# Patient Record
Sex: FEMALE | Race: WHITE | Employment: UNEMPLOYED | URBAN - METROPOLITAN AREA
[De-identification: names, ages, dates, MRNs, and addresses within clinical notes are randomized per-mention and may not be internally consistent; named-entity substitution may affect disease eponyms.]

---

## 2019-05-05 LAB
ANTIBODY SCREEN, EXTERNAL: NORMAL
HCT, EXTERNAL: 36.7
HGB, EXTERNAL: NORMAL
RPR, EXTERNAL: NORMAL
RUBELLA, EXTERNAL: NORMAL
TYPE, ABO & RH, EXTERNAL: NORMAL

## 2019-05-23 ENCOUNTER — HOSPITAL ENCOUNTER (OUTPATIENT)
Age: 24
Setting detail: OBSERVATION
Discharge: HOME | End: 2019-05-23
Attending: SPECIALIST | Admitting: OBSTETRICS & GYNECOLOGY
Payer: COMMERCIAL

## 2019-05-23 VITALS
HEART RATE: 90 BPM | WEIGHT: 137 LBS | TEMPERATURE: 99.4 F | SYSTOLIC BLOOD PRESSURE: 106 MMHG | RESPIRATION RATE: 17 BRPM | HEIGHT: 69 IN | BODY MASS INDEX: 20.29 KG/M2 | DIASTOLIC BLOOD PRESSURE: 61 MMHG

## 2019-05-23 PROBLEM — R10.9 ABDOMINAL CRAMPING: Status: ACTIVE | Noted: 2019-05-23

## 2019-05-23 PROBLEM — Z34.90 PREGNANCY: Status: ACTIVE | Noted: 2019-05-23

## 2019-05-23 LAB
ALBUMIN SERPL-MCNC: 3.2 G/DL (ref 3.4–5)
ALBUMIN/GLOB SERPL: 0.9 {RATIO} (ref 0.8–1.7)
ALP SERPL-CCNC: 94 U/L (ref 45–117)
ALT SERPL-CCNC: 21 U/L (ref 13–56)
ANION GAP SERPL CALC-SCNC: 9 MMOL/L (ref 3–18)
AST SERPL-CCNC: 12 U/L (ref 15–37)
BASOPHILS # BLD: 0 K/UL (ref 0–0.1)
BASOPHILS NFR BLD: 0 % (ref 0–2)
BILIRUB SERPL-MCNC: 0.3 MG/DL (ref 0.2–1)
BUN SERPL-MCNC: 7 MG/DL (ref 7–18)
BUN/CREAT SERPL: 9 (ref 12–20)
CALCIUM SERPL-MCNC: 8.3 MG/DL (ref 8.5–10.1)
CHLORIDE SERPL-SCNC: 104 MMOL/L (ref 100–108)
CO2 SERPL-SCNC: 23 MMOL/L (ref 21–32)
CREAT SERPL-MCNC: 0.78 MG/DL (ref 0.6–1.3)
DIFFERENTIAL METHOD BLD: ABNORMAL
EOSINOPHIL # BLD: 0.1 K/UL (ref 0–0.4)
EOSINOPHIL NFR BLD: 1 % (ref 0–5)
ERYTHROCYTE [DISTWIDTH] IN BLOOD BY AUTOMATED COUNT: 12.6 % (ref 11.6–14.5)
FIBRONECTIN FETAL VAG QL: NEGATIVE
GLOBULIN SER CALC-MCNC: 3.5 G/DL (ref 2–4)
GLUCOSE SERPL-MCNC: 74 MG/DL (ref 74–99)
HCT VFR BLD AUTO: 32.7 % (ref 35–45)
HGB BLD-MCNC: 11 G/DL (ref 12–16)
LYMPHOCYTES # BLD: 1.4 K/UL (ref 0.9–3.6)
LYMPHOCYTES NFR BLD: 14 % (ref 21–52)
MCH RBC QN AUTO: 30.1 PG (ref 24–34)
MCHC RBC AUTO-ENTMCNC: 33.6 G/DL (ref 31–37)
MCV RBC AUTO: 89.6 FL (ref 74–97)
MONOCYTES # BLD: 0.7 K/UL (ref 0.05–1.2)
MONOCYTES NFR BLD: 7 % (ref 3–10)
NEUTS SEG # BLD: 7.8 K/UL (ref 1.8–8)
NEUTS SEG NFR BLD: 78 % (ref 40–73)
PLATELET # BLD AUTO: 171 K/UL (ref 135–420)
PMV BLD AUTO: 9.6 FL (ref 9.2–11.8)
POTASSIUM SERPL-SCNC: 3.6 MMOL/L (ref 3.5–5.5)
PROT SERPL-MCNC: 6.7 G/DL (ref 6.4–8.2)
RBC # BLD AUTO: 3.65 M/UL (ref 4.2–5.3)
SODIUM SERPL-SCNC: 136 MMOL/L (ref 136–145)
WBC # BLD AUTO: 10 K/UL (ref 4.6–13.2)

## 2019-05-23 PROCEDURE — 81003 URINALYSIS AUTO W/O SCOPE: CPT

## 2019-05-23 PROCEDURE — 99218 HC RM OBSERVATION: CPT

## 2019-05-23 PROCEDURE — 80053 COMPREHEN METABOLIC PANEL: CPT

## 2019-05-23 PROCEDURE — 85025 COMPLETE CBC W/AUTO DIFF WBC: CPT

## 2019-05-23 PROCEDURE — 82731 ASSAY OF FETAL FIBRONECTIN: CPT

## 2019-05-23 PROCEDURE — 76815 OB US LIMITED FETUS(S): CPT

## 2019-05-23 PROCEDURE — 99283 EMERGENCY DEPT VISIT LOW MDM: CPT

## 2019-05-23 NOTE — PROGRESS NOTES
22 yo cf,. 23 3/7 wks in from dr nye's office with complaints of diarrhea x 3 days,feeling tired and nausea x1 yesterday. denies temp. Diarrhea couple of times each am. Lower back pain and lower abd pain. Monitors applied.     Pt denies srom, denies vag bleeding denies PIH s/s

## 2019-05-23 NOTE — PROGRESS NOTES
Dr bledsoe see pt.orders to d/c pt to home. d/c instructions pregnancy precautions, belly pain and back pain and pregnancy at 22 to 26 wks printed and reviewed with pt prior to d/c.

## 2019-05-23 NOTE — H&P
Obstetrical Problem History & Physical     Name: Emelina Camarena MRN: [id number]  SSN: [id number]    YOB: 1995  Age: 21 y.o. Sex: female        Subjective:   Chief complaint:    Chief Complaint   Patient presents with    Abdominal Pain    Diarrhea     23yo [id number] at 23.3wks with MYKE 19 by LMP c/w 6wk US in [place], moved here 1 month ago. She reports walked for several hours on  pushing her 10year old in the stroller. Since Monday, she has had loose stools in the morning only, and intermittent low abdominal cramping and low back pain. Denies fevers, chill, CP, SOB, UTI symptoms, constipation. She reports emesis rarely since then a/w nausea. Tolerating regular diet. Denies VB/LOF.  +FM. Reports  with similar symptoms. Daughter with sinus congestion.     OB HISTORY  G1-2 - EAB 1st trimester x 2 (16yrs, 19yrs)  G3 - SAB at <6wks (21yrs)  G4 -  at 38.5wks, labor F, 7lb 2oz, delivered in [facility], [address] [facility]  Denies    PAST SURGICAL HISTORY  C5-C6 spinal fusion 2017 after fracture    SOCIAL HISTORY  Social History     Socioeconomic History    Marital status:      Spouse name: Not on file    Number of children: Not on file    Years of education: Not on file    Highest education level: Not on file   Occupational History    Not on file   Social Needs    Financial resource strain: Not on file    Food insecurity:     Worry: Not on file     Inability: Not on file    Transportation needs:     Medical: Not on file     Non-medical: Not on file   Tobacco Use    Smoking status: Not on file   Substance and Sexual Activity    Alcohol use: Not on file    Drug use: Not on file    Sexual activity: Not on file   Lifestyle    Physical activity:     Days per week: Not on file     Minutes per session: Not on file    Stress: Not on file   Relationships    Social connections:     Talks on phone: Not on file     Gets together: Not on file Attends [employer] service: Not on file     Active member of club or organization: Not on file     Attends meetings of clubs or organizations: Not on file     Relationship status: Not on file    Intimate partner violence:     Fear of current or ex partner: Not on file     Emotionally abused: Not on file     Physically abused: Not on file     Forced sexual activity: Not on file   Other Topics Concern    Not on file   Social History Narrative    Not on file   .  from here, moved to [place] 1 month ago from [facility]. Quit smoking this pregnancy. Had 1 glass of wine this week with subsequent emesis. Denies regular alcohol intake. Denies illicit drug use. FAMILY HISTORY  Denies     ALLERGY:  NKDA    HOME MEDICATIONS:  Prenatal Vitamin 1 tablet  po daily    Review of Systems:  A comprehensive review of systems was negative except for mentioned in HPI. Objective:   VITAL SIGNS:  Visit Vitals  /61 (BP 1 Location: Left arm, BP Patient Position: Sitting)   Pulse 90   Temp 99.4 °F (37.4 °C)   Resp 17       Physical Exam:  Gen - A&O, NAD  Abd - soft, gravid, nontender  Pelvic:  No masses/lesions, physiologic discharge, no bleeding  Cervix: cl/th/hi, posterior  Extremities: nontender, no edema/redness    BSUS:  Transverse to complete breech, anterior placenta intact without evidence of abruption, MBP 5.7cm  TOCO:  No ctxs  FHTs:  140s min-mod variability, no decels    UA neg protein, nitrites, blood, ketones, bili, trace leuks, specific gravity 1/015. Assessment/Plan:     23yo [id number] at 23.3wks with abdominal cramping and loose stools  - Afebrile. Exam wnl. CBC, CMP, FFN obtained. - Pt already tolerating regular diet. Continue to po hydrate. UA wnl.      Signed By:    Michael Ball MD  May 23, 2019 4:27 PM

## 2019-05-23 NOTE — PROGRESS NOTES
Spoke with dr Eddie Anthony that is covering for dr Christina Worley.  Orders for urine dip-results given, cbc and cmp

## 2019-05-23 NOTE — DISCHARGE SUMMARY
Obstetrical Discharge Summary     Name: Navneet Bello MRN: [id number]  SSN: [id number]    YOB: 1995  Age: 21 y.o. Sex: female      Admit Date: 5/23/2019    Discharge Date: 5/23/2019     Admitting Physician: Enoch Castellano MD     Attending Physician:  Ashley Garcia MD     Admission Diagnoses: Abdominal cramping [R10.9]  Pregnancy [Z34.90]    Discharge Diagnoses: This patient has no babies on file. Additional Diagnoses:   Hospital Problems  Date Reviewed: 5/23/2019          Codes Class Noted POA    Pregnancy ICD-10-CM: Z34.90  ICD-9-CM: V22.2  5/23/2019 Yes        Abdominal cramping ICD-10-CM: R10.9  ICD-9-CM: 789.00  5/23/2019 Yes             Lab Results   Component Value Date/Time    Rubella, External immune 05/05/2019       Immunization(s):   There is no immunization history on file for this patient. Labs:   Recent Results (from the past 24 hour(s))   CBC WITH AUTOMATED DIFF    Collection Time: 05/23/19  3:30 PM   Result Value Ref Range    WBC 10.0 4.6 - 13.2 K/uL    RBC 3.65 (L) 4.20 - 5.30 M/uL    HGB 11.0 (L) 12.0 - 16.0 g/dL    HCT 32.7 (L) 35.0 - 45.0 %    MCV 89.6 74.0 - 97.0 FL    MCH 30.1 24.0 - 34.0 PG    MCHC 33.6 31.0 - 37.0 g/dL    RDW 12.6 11.6 - 14.5 %    PLATELET 362 156 - 620 K/uL    MPV 9.6 9.2 - 11.8 FL    NEUTROPHILS 78 (H) 40 - 73 %    LYMPHOCYTES 14 (L) 21 - 52 %    MONOCYTES 7 3 - 10 %    EOSINOPHILS 1 0 - 5 %    BASOPHILS 0 0 - 2 %    ABS. NEUTROPHILS 7.8 1.8 - 8.0 K/UL    ABS. LYMPHOCYTES 1.4 0.9 - 3.6 K/UL    ABS. MONOCYTES 0.7 0.05 - 1.2 K/UL    ABS. EOSINOPHILS 0.1 0.0 - 0.4 K/UL    ABS.  BASOPHILS 0.0 0.0 - 0.1 K/UL    DF AUTOMATED     METABOLIC PANEL, COMPREHENSIVE    Collection Time: 05/23/19  3:30 PM   Result Value Ref Range    Sodium 136 136 - 145 mmol/L    Potassium 3.6 3.5 - 5.5 mmol/L    Chloride 104 100 - 108 mmol/L    CO2 23 21 - 32 mmol/L    Anion gap 9 3.0 - 18 mmol/L    Glucose 74 74 - 99 mg/dL    BUN 7 7.0 - 18 MG/DL    Creatinine 0.78 0.6 - 1.3 MG/DL    BUN/Creatinine ratio 9 (L) 12 - 20      GFR est AA >60 >60 ml/min/1.73m2    GFR est non-AA >60 >60 ml/min/1.73m2    Calcium 8.3 (L) 8.5 - 10.1 MG/DL    Bilirubin, total 0.3 0.2 - 1.0 MG/DL    ALT (SGPT) 21 13 - 56 U/L    AST (SGOT) 12 (L) 15 - 37 U/L    Alk. phosphatase 94 45 - 117 U/L    Protein, total 6.7 6.4 - 8.2 g/dL    Albumin 3.2 (L) 3.4 - 5.0 g/dL    Globulin 3.5 2.0 - 4.0 g/dL    A-G Ratio 0.9 0.8 - 1.7     FETAL FIBRONECTIN    Collection Time: 05/23/19  4:20 PM   Result Value Ref Range    Fetal fibronectin NEGATIVE  NEG         Exam:  Genera:  Alert & oriented, no apparent distress  Abdomen:  Soft, gravid, nttp  Extremities:  LE b/l without clubbing/cyanosis/edema/redness, nontender to palpation    Hospital Course: 21 y.o. [id number] at 23.3wks with abdominal cramping and loose stools. CBC, CMP wnl. FFN negative. Pt continues to tolerate regular diet. She has appt to follow up with Dr. Rani Castillo 5/27. Fever, pain, bleeding precautions reviewed. Pt voices an understanding and desires to go home. Patient Instructions:   Current Discharge Medication List      CONTINUE these medications which have NOT CHANGED    Details   [id number] sanjuanita-folic-dha (PRENATAL DHA+COMPLETE PRENATAL) -300 mg-mcg-mg cmpk Take  by mouth. Indications: pregnancy             Reference my discharge instructions. Follow-up Appointments   Procedures    FOLLOW UP VISIT Appointment in: One Week Follow up at scheduled appt with Dr. Rani Castillo 5/28. Follow up at scheduled appt with Dr. Rani Castillo 5/28. Standing Status:   Standing     Number of Occurrences:   1     Order Specific Question:   Appointment in     Answer:    One Week        Signed By:  Jojo Lyle MD     May 23, 2019

## 2019-05-24 LAB
APPEARANCE UR: CLEAR
BILIRUB UR QL: NEGATIVE
COLOR UR: YELLOW
GLUCOSE UR QL STRIP.AUTO: NEGATIVE MG/DL
KETONES UR-MCNC: NEGATIVE MG/DL
LEUKOCYTE ESTERASE UR QL STRIP: ABNORMAL
NITRITE UR QL: NEGATIVE
PH UR: 7.5 [PH] (ref 5–9)
PROT UR QL: NEGATIVE MG/DL
RBC # UR STRIP: NEGATIVE /UL
SERVICE CMNT-IMP: ABNORMAL
SP GR UR: 1.01 (ref 1–1.02)
UROBILINOGEN UR QL: 0.2 EU/DL (ref 0.2–1)

## 2019-09-14 ENCOUNTER — HOSPITAL ENCOUNTER (INPATIENT)
Age: 24
LOS: 2 days | Discharge: HOME | End: 2019-09-16
Attending: SPECIALIST | Admitting: SPECIALIST
Payer: COMMERCIAL

## 2019-09-14 ENCOUNTER — ANESTHESIA EVENT (OUTPATIENT)
Dept: LABOR AND DELIVERY | Age: 24
End: 2019-09-14
Payer: COMMERCIAL

## 2019-09-14 ENCOUNTER — ANESTHESIA (OUTPATIENT)
Dept: LABOR AND DELIVERY | Age: 24
End: 2019-09-14
Payer: COMMERCIAL

## 2019-09-14 LAB
ABO + RH BLD: NORMAL
BASOPHILS # BLD: 0 K/UL (ref 0–0.1)
BASOPHILS # BLD: 0 K/UL (ref 0–0.1)
BASOPHILS NFR BLD: 0 % (ref 0–2)
BASOPHILS NFR BLD: 0 % (ref 0–2)
BLOOD GROUP ANTIBODIES SERPL: NORMAL
BLOOD GROUP ANTIBODIES SERPL: NORMAL
DIFFERENTIAL METHOD BLD: ABNORMAL
DIFFERENTIAL METHOD BLD: ABNORMAL
EOSINOPHIL # BLD: 0.1 K/UL (ref 0–0.4)
EOSINOPHIL # BLD: 0.1 K/UL (ref 0–0.4)
EOSINOPHIL NFR BLD: 1 % (ref 0–5)
EOSINOPHIL NFR BLD: 1 % (ref 0–5)
ERYTHROCYTE [DISTWIDTH] IN BLOOD BY AUTOMATED COUNT: 13.3 % (ref 11.6–14.5)
ERYTHROCYTE [DISTWIDTH] IN BLOOD BY AUTOMATED COUNT: 13.4 % (ref 11.6–14.5)
HCT VFR BLD AUTO: 21.7 % (ref 35–45)
HCT VFR BLD AUTO: 32.3 % (ref 35–45)
HGB BLD-MCNC: 10.8 G/DL (ref 12–16)
HGB BLD-MCNC: 7.6 G/DL (ref 12–16)
LYMPHOCYTES # BLD: 1.6 K/UL (ref 0.9–3.6)
LYMPHOCYTES # BLD: 2.4 K/UL (ref 0.9–3.6)
LYMPHOCYTES NFR BLD: 18 % (ref 21–52)
LYMPHOCYTES NFR BLD: 23 % (ref 21–52)
MCH RBC QN AUTO: 29.5 PG (ref 24–34)
MCH RBC QN AUTO: 30.8 PG (ref 24–34)
MCHC RBC AUTO-ENTMCNC: 33.4 G/DL (ref 31–37)
MCHC RBC AUTO-ENTMCNC: 35 G/DL (ref 31–37)
MCV RBC AUTO: 87.9 FL (ref 74–97)
MCV RBC AUTO: 88.3 FL (ref 74–97)
MONOCYTES # BLD: 0.4 K/UL (ref 0.05–1.2)
MONOCYTES # BLD: 0.7 K/UL (ref 0.05–1.2)
MONOCYTES NFR BLD: 5 % (ref 3–10)
MONOCYTES NFR BLD: 6 % (ref 3–10)
NEUTS SEG # BLD: 10.2 K/UL (ref 1.8–8)
NEUTS SEG # BLD: 4.9 K/UL (ref 1.8–8)
NEUTS SEG NFR BLD: 70 % (ref 40–73)
NEUTS SEG NFR BLD: 76 % (ref 40–73)
PLATELET # BLD AUTO: 175 K/UL (ref 135–420)
PLATELET # BLD AUTO: 89 K/UL (ref 135–420)
PMV BLD AUTO: 11.2 FL (ref 9.2–11.8)
PMV BLD AUTO: 9.5 FL (ref 9.2–11.8)
RBC # BLD AUTO: 2.47 M/UL (ref 4.2–5.3)
RBC # BLD AUTO: 3.66 M/UL (ref 4.2–5.3)
SPECIMEN EXP DATE BLD: NORMAL
WBC # BLD AUTO: 13.5 K/UL (ref 4.6–13.2)
WBC # BLD AUTO: 6.9 K/UL (ref 4.6–13.2)

## 2019-09-14 PROCEDURE — 75410000000 HC DELIVERY VAGINAL/SINGLE

## 2019-09-14 PROCEDURE — 76060000078 HC EPIDURAL ANESTHESIA

## 2019-09-14 PROCEDURE — 74011000250 HC RX REV CODE- 250: Performed by: NURSE ANESTHETIST, CERTIFIED REGISTERED

## 2019-09-14 PROCEDURE — 75410000002 HC LABOR FEE PER 1 HR

## 2019-09-14 PROCEDURE — 00HU33Z INSERTION OF INFUSION DEVICE INTO SPINAL CANAL, PERCUTANEOUS APPROACH: ICD-10-PCS | Performed by: NURSE ANESTHETIST, CERTIFIED REGISTERED

## 2019-09-14 PROCEDURE — 86870 RBC ANTIBODY IDENTIFICATION: CPT

## 2019-09-14 PROCEDURE — 74011250636 HC RX REV CODE- 250/636: Performed by: SPECIALIST

## 2019-09-14 PROCEDURE — 74011250636 HC RX REV CODE- 250/636

## 2019-09-14 PROCEDURE — 75410000003 HC RECOV DEL/VAG/CSECN EA 0.5 HR

## 2019-09-14 PROCEDURE — 77030034849

## 2019-09-14 PROCEDURE — 86900 BLOOD TYPING SEROLOGIC ABO: CPT

## 2019-09-14 PROCEDURE — 74011250637 HC RX REV CODE- 250/637: Performed by: SPECIALIST

## 2019-09-14 PROCEDURE — 65270000029 HC RM PRIVATE

## 2019-09-14 PROCEDURE — 74011000250 HC RX REV CODE- 250

## 2019-09-14 PROCEDURE — 77030007879 HC KT SPN EPDRL TELE -B: Performed by: NURSE ANESTHETIST, CERTIFIED REGISTERED

## 2019-09-14 PROCEDURE — 85025 COMPLETE CBC W/AUTO DIFF WBC: CPT

## 2019-09-14 PROCEDURE — 74011250636 HC RX REV CODE- 250/636: Performed by: NURSE ANESTHETIST, CERTIFIED REGISTERED

## 2019-09-14 PROCEDURE — 51702 INSERT TEMP BLADDER CATH: CPT

## 2019-09-14 RX ORDER — ONDANSETRON 2 MG/ML
4 INJECTION INTRAMUSCULAR; INTRAVENOUS
Status: DISCONTINUED | OUTPATIENT
Start: 2019-09-14 | End: 2019-09-14 | Stop reason: HOSPADM

## 2019-09-14 RX ORDER — ACETAMINOPHEN 325 MG/1
650 TABLET ORAL
Status: DISCONTINUED | OUTPATIENT
Start: 2019-09-14 | End: 2019-09-14 | Stop reason: HOSPADM

## 2019-09-14 RX ORDER — OXYTOCIN/RINGER'S LACTATE 20/1000 ML
999 PLASTIC BAG, INJECTION (ML) INTRAVENOUS ONCE
Status: COMPLETED | OUTPATIENT
Start: 2019-09-14 | End: 2019-09-14

## 2019-09-14 RX ORDER — ZOLPIDEM TARTRATE 5 MG/1
5 TABLET ORAL
Status: DISCONTINUED | OUTPATIENT
Start: 2019-09-14 | End: 2019-09-16 | Stop reason: HOSPADM

## 2019-09-14 RX ORDER — AMOXICILLIN 250 MG
1 CAPSULE ORAL
Status: DISCONTINUED | OUTPATIENT
Start: 2019-09-14 | End: 2019-09-16 | Stop reason: HOSPADM

## 2019-09-14 RX ORDER — PROMETHAZINE HYDROCHLORIDE 25 MG/ML
25 INJECTION, SOLUTION INTRAMUSCULAR; INTRAVENOUS
Status: DISCONTINUED | OUTPATIENT
Start: 2019-09-14 | End: 2019-09-16 | Stop reason: HOSPADM

## 2019-09-14 RX ORDER — NALOXONE HYDROCHLORIDE 0.4 MG/ML
0.4 INJECTION, SOLUTION INTRAMUSCULAR; INTRAVENOUS; SUBCUTANEOUS AS NEEDED
Status: DISCONTINUED | OUTPATIENT
Start: 2019-09-14 | End: 2019-09-16 | Stop reason: HOSPADM

## 2019-09-14 RX ORDER — HYDROMORPHONE HYDROCHLORIDE 1 MG/ML
1 INJECTION, SOLUTION INTRAMUSCULAR; INTRAVENOUS; SUBCUTANEOUS
Status: DISCONTINUED | OUTPATIENT
Start: 2019-09-14 | End: 2019-09-14 | Stop reason: HOSPADM

## 2019-09-14 RX ORDER — DIPHENHYDRAMINE HYDROCHLORIDE 50 MG/ML
25 INJECTION, SOLUTION INTRAMUSCULAR; INTRAVENOUS
Status: DISCONTINUED | OUTPATIENT
Start: 2019-09-14 | End: 2019-09-14 | Stop reason: HOSPADM

## 2019-09-14 RX ORDER — NALBUPHINE HYDROCHLORIDE 10 MG/ML
2.5 INJECTION, SOLUTION INTRAMUSCULAR; INTRAVENOUS; SUBCUTANEOUS
Status: DISCONTINUED | OUTPATIENT
Start: 2019-09-14 | End: 2019-09-14 | Stop reason: HOSPADM

## 2019-09-14 RX ORDER — ROPIVACAINE HYDROCHLORIDE 2 MG/ML
INJECTION, SOLUTION EPIDURAL; INFILTRATION; PERINEURAL AS NEEDED
Status: DISCONTINUED | OUTPATIENT
Start: 2019-09-14 | End: 2019-09-14 | Stop reason: HOSPADM

## 2019-09-14 RX ORDER — IBUPROFEN 400 MG/1
800 TABLET ORAL
Status: DISCONTINUED | OUTPATIENT
Start: 2019-09-14 | End: 2019-09-16 | Stop reason: HOSPADM

## 2019-09-14 RX ORDER — PHENYLEPHRINE HCL IN 0.9% NACL 1 MG/10 ML
80 SYRINGE (ML) INTRAVENOUS AS NEEDED
Status: DISCONTINUED | OUTPATIENT
Start: 2019-09-14 | End: 2019-09-14 | Stop reason: HOSPADM

## 2019-09-14 RX ORDER — LIDOCAINE HYDROCHLORIDE 10 MG/ML
30 INJECTION, SOLUTION EPIDURAL; INFILTRATION; INTRACAUDAL; PERINEURAL AS NEEDED
Status: DISCONTINUED | OUTPATIENT
Start: 2019-09-14 | End: 2019-09-14 | Stop reason: HOSPADM

## 2019-09-14 RX ORDER — MAG HYDROX/ALUMINUM HYD/SIMETH 200-200-20
15 SUSPENSION, ORAL (FINAL DOSE FORM) ORAL
Status: DISCONTINUED | OUTPATIENT
Start: 2019-09-14 | End: 2019-09-14 | Stop reason: HOSPADM

## 2019-09-14 RX ORDER — MISOPROSTOL 200 UG/1
800 TABLET ORAL
Status: ACTIVE | OUTPATIENT
Start: 2019-09-14 | End: 2019-09-15

## 2019-09-14 RX ORDER — OXYTOCIN/RINGER'S LACTATE 20/1000 ML
125 PLASTIC BAG, INJECTION (ML) INTRAVENOUS CONTINUOUS
Status: DISCONTINUED | OUTPATIENT
Start: 2019-09-14 | End: 2019-09-14 | Stop reason: HOSPADM

## 2019-09-14 RX ORDER — OXYCODONE AND ACETAMINOPHEN 5; 325 MG/1; MG/1
2 TABLET ORAL
Status: DISCONTINUED | OUTPATIENT
Start: 2019-09-14 | End: 2019-09-16 | Stop reason: HOSPADM

## 2019-09-14 RX ORDER — MISOPROSTOL 200 UG/1
TABLET ORAL
Status: DISCONTINUED
Start: 2019-09-14 | End: 2019-09-14 | Stop reason: WASHOUT

## 2019-09-14 RX ORDER — TERBUTALINE SULFATE 1 MG/ML
0.25 INJECTION SUBCUTANEOUS
Status: DISCONTINUED | OUTPATIENT
Start: 2019-09-14 | End: 2019-09-14 | Stop reason: HOSPADM

## 2019-09-14 RX ORDER — LIDOCAINE HYDROCHLORIDE AND EPINEPHRINE 15; 5 MG/ML; UG/ML
INJECTION, SOLUTION EPIDURAL
Status: COMPLETED | OUTPATIENT
Start: 2019-09-14 | End: 2019-09-14

## 2019-09-14 RX ORDER — SALICYLIC ACID
90 POWDER (GRAM) MISCELLANEOUS ONCE
Status: DISPENSED | OUTPATIENT
Start: 2019-09-14 | End: 2019-09-14

## 2019-09-14 RX ORDER — METHYLERGONOVINE MALEATE 0.2 MG/ML
0.2 INJECTION INTRAVENOUS AS NEEDED
Status: DISCONTINUED | OUTPATIENT
Start: 2019-09-14 | End: 2019-09-16 | Stop reason: HOSPADM

## 2019-09-14 RX ORDER — FENTANYL CITRATE 50 UG/ML
100 INJECTION, SOLUTION INTRAMUSCULAR; INTRAVENOUS ONCE
Status: DISCONTINUED | OUTPATIENT
Start: 2019-09-14 | End: 2019-09-14 | Stop reason: HOSPADM

## 2019-09-14 RX ORDER — ACETAMINOPHEN 325 MG/1
650 TABLET ORAL
Status: DISCONTINUED | OUTPATIENT
Start: 2019-09-14 | End: 2019-09-16 | Stop reason: HOSPADM

## 2019-09-14 RX ORDER — OXYTOCIN 10 [USP'U]/ML
10 INJECTION, SOLUTION INTRAMUSCULAR; INTRAVENOUS
Status: DISCONTINUED | OUTPATIENT
Start: 2019-09-14 | End: 2019-09-14 | Stop reason: HOSPADM

## 2019-09-14 RX ORDER — FENTANYL CITRATE 50 UG/ML
100 INJECTION, SOLUTION INTRAMUSCULAR; INTRAVENOUS ONCE
Status: COMPLETED | OUTPATIENT
Start: 2019-09-14 | End: 2019-09-14

## 2019-09-14 RX ORDER — PHENYLEPHRINE HCL IN 0.9% NACL 1 MG/10 ML
100 SYRINGE (ML) INTRAVENOUS AS NEEDED
Status: DISCONTINUED | OUTPATIENT
Start: 2019-09-14 | End: 2019-09-14 | Stop reason: HOSPADM

## 2019-09-14 RX ORDER — NALBUPHINE HYDROCHLORIDE 10 MG/ML
10 INJECTION, SOLUTION INTRAMUSCULAR; INTRAVENOUS; SUBCUTANEOUS
Status: DISCONTINUED | OUTPATIENT
Start: 2019-09-14 | End: 2019-09-14 | Stop reason: HOSPADM

## 2019-09-14 RX ORDER — NALOXONE HYDROCHLORIDE 0.4 MG/ML
0.2 INJECTION, SOLUTION INTRAMUSCULAR; INTRAVENOUS; SUBCUTANEOUS AS NEEDED
Status: DISCONTINUED | OUTPATIENT
Start: 2019-09-14 | End: 2019-09-14 | Stop reason: HOSPADM

## 2019-09-14 RX ORDER — SODIUM CHLORIDE, SODIUM LACTATE, POTASSIUM CHLORIDE, CALCIUM CHLORIDE 600; 310; 30; 20 MG/100ML; MG/100ML; MG/100ML; MG/100ML
125 INJECTION, SOLUTION INTRAVENOUS CONTINUOUS
Status: DISCONTINUED | OUTPATIENT
Start: 2019-09-14 | End: 2019-09-14 | Stop reason: HOSPADM

## 2019-09-14 RX ORDER — MINERAL OIL
30 OIL (ML) ORAL AS NEEDED
Status: DISCONTINUED | OUTPATIENT
Start: 2019-09-14 | End: 2019-09-14 | Stop reason: HOSPADM

## 2019-09-14 RX ORDER — EPHEDRINE SULFATE/0.9% NACL/PF 50 MG/5 ML
10 SYRINGE (ML) INTRAVENOUS AS NEEDED
Status: COMPLETED | OUTPATIENT
Start: 2019-09-14 | End: 2019-09-14

## 2019-09-14 RX ADMIN — Medication 10 ML/HR: at 11:30

## 2019-09-14 RX ADMIN — NALBUPHINE HYDROCHLORIDE 10 MG: 10 INJECTION, SOLUTION INTRAMUSCULAR; INTRAVENOUS; SUBCUTANEOUS at 07:58

## 2019-09-14 RX ADMIN — IBUPROFEN 800 MG: 400 TABLET, FILM COATED ORAL at 21:05

## 2019-09-14 RX ADMIN — Medication 5 MG: at 13:03

## 2019-09-14 RX ADMIN — SODIUM CHLORIDE, SODIUM LACTATE, POTASSIUM CHLORIDE, AND CALCIUM CHLORIDE 125 ML/HR: 600; 310; 30; 20 INJECTION, SOLUTION INTRAVENOUS at 08:19

## 2019-09-14 RX ADMIN — LIDOCAINE HYDROCHLORIDE AND EPINEPHRINE 3 ML: 15; 5 INJECTION, SOLUTION EPIDURAL at 11:19

## 2019-09-14 RX ADMIN — SODIUM CHLORIDE, SODIUM LACTATE, POTASSIUM CHLORIDE, AND CALCIUM CHLORIDE 125 ML/HR: 600; 310; 30; 20 INJECTION, SOLUTION INTRAVENOUS at 12:03

## 2019-09-14 RX ADMIN — ROPIVACAINE HYDROCHLORIDE 8 ML: 2 INJECTION, SOLUTION EPIDURAL; INFILTRATION; PERINEURAL at 11:29

## 2019-09-14 RX ADMIN — SODIUM CHLORIDE, SODIUM LACTATE, POTASSIUM CHLORIDE, AND CALCIUM CHLORIDE 1000 ML: 600; 310; 30; 20 INJECTION, SOLUTION INTRAVENOUS at 11:30

## 2019-09-14 RX ADMIN — SODIUM CHLORIDE, SODIUM LACTATE, POTASSIUM CHLORIDE, AND CALCIUM CHLORIDE 125 ML/HR: 600; 310; 30; 20 INJECTION, SOLUTION INTRAVENOUS at 13:14

## 2019-09-14 RX ADMIN — Medication 19980 MILLI-UNITS/HR: at 15:26

## 2019-09-14 RX ADMIN — Medication 5 MG: at 12:52

## 2019-09-14 RX ADMIN — FENTANYL CITRATE 100 MCG: 50 INJECTION, SOLUTION INTRAMUSCULAR; INTRAVENOUS at 11:28

## 2019-09-14 NOTE — H&P
Obstetrical History & Physical    Name: Dang Darling MRN: [id number]  SSN: [id number]    YOB: 1995  Age: 21 y. o. Assessment/Plan:     IDENTIFYING DATA  Dang Darling is a  21 y.o.  (Prior  7lbs) pregnant patient. with SROM at 1100 pm last night. Estimated Date of Delivery: 19  Her Estimated Gestational Age is 39w5d weeks. Spontaneous onset labor. Blood group A negative. GC Chlamydia NEGATIVE. Rubella varicella immune. Hb AA. TSH nl,  Quad screen negative. GDM screen nl, GBS negative. Now smooth progress to 7 cm dilation. Intermittent variables. Subjective:   Chief complaint:    Chief Complaint   Patient presents with    Rupture of Membranes     ~2300 2019     Estimated Date of Delivery: 19  OB History        5    Para   1    Term   1            AB   3    Living   1       SAB        TAB        Ectopic        Molar        Multiple        Live Births   1                History reviewed. No pertinent past medical history. History reviewed. No pertinent surgical history. Social History     Occupational History    Not on file   Tobacco Use    Smoking status: Former Smoker    Smokeless tobacco: Never Used   Substance and Sexual Activity    Alcohol use: Not Currently     Frequency: Never    Drug use: Never    Sexual activity: Yes     Partners: Male     Birth control/protection: None     History reviewed. No pertinent family history. No Known Allergies  Prior to Admission medications    Medication Sig Start Date End Date Taking? Authorizing Provider   [id number] sanjuanita-folic-dha (PRENATAL DHA+COMPLETE PRENATAL) [id number] mg-mcg-mg cmpk Take  by mouth. Indications: pregnancy   Yes Provider, Historical        Review of Systems: A comprehensive review of systems was negative except for that written in the HPI.     Objective:   Vitals:  Vitals:    19 1300 19 1302 19 1304 19 1305   BP: 97/49 93/45  93/49   Pulse: 70 67  73   Resp:       Temp:       SpO2:   100%    Weight:       Height:            CERVICAL EXAM: (data input under \"more activities\" and \"flowsheets\" at the top.)  Cervical Exam  Dilation (cm): 6  Eff: 80 %  Station: 0  Vaginal exam done by? : MURIEL Avila  Membrane Status: SROM    FETAL MONITORING:  Baseline FHR: No data found. .  Membranes:  Spontaneous Rupture of Membranes; Amniotic Fluid: clear fluid    Labs:  Recent Results (from the past 12 hour(s))   CBC WITH AUTOMATED DIFF    Collection Time: 09/14/19  3:23 AM   Result Value Ref Range    WBC 6.9 4.6 - 13.2 K/uL    RBC 2.47 (L) 4.20 - 5.30 M/uL    HGB 7.6 (L) 12.0 - 16.0 g/dL    HCT 21.7 (L) 35.0 - 45.0 %    MCV 87.9 74.0 - 97.0 FL    MCH 30.8 24.0 - 34.0 PG    MCHC 35.0 31.0 - 37.0 g/dL    RDW 13.4 11.6 - 14.5 %    PLATELET 89 (L) 480 - 420 K/uL    MPV 11.2 9.2 - 11.8 FL    NEUTROPHILS 70 40 - 73 %    LYMPHOCYTES 23 21 - 52 %    MONOCYTES 6 3 - 10 %    EOSINOPHILS 1 0 - 5 %    BASOPHILS 0 0 - 2 %    ABS. NEUTROPHILS 4.9 1.8 - 8.0 K/UL    ABS. LYMPHOCYTES 1.6 0.9 - 3.6 K/UL    ABS. MONOCYTES 0.4 0.05 - 1.2 K/UL    ABS. EOSINOPHILS 0.1 0.0 - 0.4 K/UL    ABS. BASOPHILS 0.0 0.0 - 0.1 K/UL    DF AUTOMATED     TYPE & SCREEN    Collection Time: 09/14/19  3:23 AM   Result Value Ref Range    Crossmatch Expiration 09/17/2019     ABO/Rh(D) A NEGATIVE     Antibody screen POS     Antibody ID anti-D, recent RHIG injection    CBC WITH AUTOMATED DIFF    Collection Time: 09/14/19  8:25 AM   Result Value Ref Range    WBC 13.5 (H) 4.6 - 13.2 K/uL    RBC 3.66 (L) 4.20 - 5.30 M/uL    HGB 10.8 (L) 12.0 - 16.0 g/dL    HCT 32.3 (L) 35.0 - 45.0 %    MCV 88.3 74.0 - 97.0 FL    MCH 29.5 24.0 - 34.0 PG    MCHC 33.4 31.0 - 37.0 g/dL    RDW 13.3 11.6 - 14.5 %    PLATELET 258 197 - 528 K/uL    MPV 9.5 9.2 - 11.8 FL    NEUTROPHILS 76 (H) 40 - 73 %    LYMPHOCYTES 18 (L) 21 - 52 %    MONOCYTES 5 3 - 10 %    EOSINOPHILS 1 0 - 5 %    BASOPHILS 0 0 - 2 %    ABS.  NEUTROPHILS 10.2 (H) 1.8 - 8.0 K/UL    ABS. LYMPHOCYTES 2.4 0.9 - 3.6 K/UL    ABS. MONOCYTES 0.7 0.05 - 1.2 K/UL    ABS. EOSINOPHILS 0.1 0.0 - 0.4 K/UL    ABS. BASOPHILS 0.0 0.0 - 0.1 K/UL    DF AUTOMATED         GENERAL APPEARANCE:  HEAD, EYES, EARS, NOSE, THROAT:Normocephalic. EOMI, Hearing intact. NECK: Supple with normal appearance. THYROID: No apparent abnormal size, nodularity or asymmetry. RESPIRATORY EFFORT: Breathing comfortably without apparent difficulty. HEART: Pulse normal.  REFLEXES: normal.  ABDOMEN: Appropriate for gestational age. SCARS: none. SKIN: Unusual coloration not apparent. NEUROPSYCHIATRIC: Oriented to time and place with grossly appropriate affect. EXTERNAL GENITALIA: Grossly normal age appropriate appearance. Lesions, Trauma, Bartholin's abscess, Vulvitis- not detected. URETHRAL MEATUS: Normal appearance. URETHRA: Masses, Tenderness, Prolapse, Lesions - not detected. BLADDER: Fullness, masses, tenderness - not detected. Prenatal Labs:   Lab Results   Component Value Date/Time    Rubella, External immune 05/05/2019    RPR, External non react 05/05/2019     IMPRESSION:    SROM, SOL good progress to 7 cm. Intermittent variables without trending. Good Epidural.         RECOMMENDATIONS:    Watchful waiting.   Manage labor and deliver                Signed By:    Leandro Linn MD  September 14, 2019 1:09 PM

## 2019-09-14 NOTE — ANESTHESIA PROCEDURE NOTES
Epidural Block    Start time: 9/14/2019 11:03 AM  End time: 9/14/2019 11:30 AM  Performed by: Maddie Briseno CRNA  Authorized by: Maddie Briseno CRNA     Pre-Procedure  Indication: at surgeon's request and labor epidural    Preanesthetic Checklist: patient identified, risks and benefits discussed, anesthesia consent, site marked, patient being monitored, timeout performed and anesthesia consent    Timeout Time: 11:06        Epidural:   Patient position:  Seated  Prep region:  Lumbar  Prep: Betadine and Patient draped    Location:  L3-4    Needle and Epidural Catheter:   Needle Type:  Tuohy  Needle Gauge:  19 G  Injection Technique:  Loss of resistance using air  Attempts:  1  Catheter Size:  18 G  Events: no blood with aspiration, no cerebrospinal fluid with aspiration, no paresthesia and negative aspiration test    Test Dose:  Negative    Assessment:   Catheter Secured:  Tegaderm and tape  Insertion:  Uncomplicated  Patient tolerance:  Patient tolerated the procedure well with no immediate complications

## 2019-09-14 NOTE — PROGRESS NOTES
To room in response to emergency call flores. Bedside RN pulled secondary to FHR deceleration. Variables noted on monitor. Pt is post-epidural and blood pressures are less than 10% variance from prior to epidural. Baseline 120, moderate variailibyt, +accels, occasional variables. SVE 7-8, 80/-1. She is leaking clear fluid, amnihook used to make access for FSE through forebag and probe was applied without difficulty. During author of this note, another deceleration occurred, I returned to room and it was spontaneously resolving. Recommend turn pt from left lateral to right to observe fetal response to new position.  Will update Dr. Suzie Richey as this is his patient

## 2019-09-14 NOTE — ANESTHESIA PREPROCEDURE EVALUATION
Relevant Problems   No relevant active problems       Anesthetic History   No history of anesthetic complications            Review of Systems / Medical History      Pulmonary          Smoker      Comments: H/o smoking,quit during pregnancy   Neuro/Psych   Within defined limits           Cardiovascular  Within defined limits                     GI/Hepatic/Renal  Within defined limits              Endo/Other  Within defined limits           Other Findings              Physical Exam    Airway  Mallampati: I  TM Distance: 4 - 6 cm  Neck ROM: normal range of motion   Mouth opening: Normal     Cardiovascular  Regular rate and rhythm,  S1 and S2 normal,  no murmur, click, rub, or gallop             Dental  No notable dental hx       Pulmonary  Breath sounds clear to auscultation               Abdominal  GI exam deferred       Other Findings            Anesthetic Plan    ASA: 1  Anesthesia type: epidural            Anesthetic plan and risks discussed with: Patient and Spouse

## 2019-09-14 NOTE — ROUTINE PROCESS
3985 - Bedside and Verbal shift change report given to Ty Pond RN (oncoming nurse) by Elyse Hugo RN (offgoing nurse). Report included the following information SBAR, MAR and Recent Results. 7228 - rounded on pt. Pt breathing through contractions. Pt denies needs and anticipates natural labor. Pad changed. IV in left hand not documented. This RN to document that it is already in place when she arrived to pts room. 9371 - pt called out asking for IV pain medication and an epidural  0755 - SVE by this RN; 4/80/-1  0758 - nubain given  0800 - RN paged anesthesia for epidural  0801 Amber Gallego CRNA returned page and will be up shortly  Carmel Gotti., ISREAL called to say pts platelets were 89 accompanied with low H/H. This RN not made aware of this during report from night nurse. 2532 - RN called Dr. Arturo Ferrell and informed him of lab results; no further orders received. 0696 - paged anesthesia to let them know MD aware and no further orders received. 2810 - received a call from ISREAL Abad. Anesthesiologist said he will not do epidural with platlet below 100 without cause. 9795 - called Dr. Arturo Ferrell to get verbal order for new labs. Order received. 8245 - phlebotomist on unit; she will do blood draw for CBC  0906 - received a call from Darryl Watts in the lab and she states the previous sample (from 0300 hour) was clotted and new results are more in line with previous lab results. RN will make anesthesia aware. 7023 - updated pt  0910 - paged anesthesia for epidural  0911 - Michael Salgado CRNA called back to say she is getting ready to start a case and MD would be up to the unit. 2572 - received a call from PACU nurse stating anesthesia MD was in a code at this time and would be up ASAP  1000 - anesthesia paged  1001 - anesthesia called back to say they are still tied up in a code. 1010 - Pt updated on epidural status. Pt requesting SVE update on labor. SVE done by this RN; 6/80/0.   Pt contemplating continuing with original plan of no epidural.  Pt advised that she can always reevaluate plan when anesthesia is available; pt agreeable to continue thinking. 1050 - anesthesia on unit  1059 - pt would like epidural  1106 - anesthesia time out  1119 - test dose  1130 - epidural complete  1205 - arnold placed  1206 - FHR dipped to 80's. Pt turned to left lateral position and FHR recovered to baseline. Will instruct pt to remain in left side lying position  1214 - FHR decel again. RN has remained in room since 1205 arnold placement. Pt did not move at all to induce decel. RN pushed called with no response then pulled emergency cord for help and applied O2 @ 12LPM per NRB. LR bolus started. [address] - [place] [place] for [facility] was first to show up for help. SVE done; 8cm. FSE placed. FHR recovered to baseline  1212 - LR bolus continues d/t sustained decrease in BP. Pt is asymptomatic for hypotension. 1224 - another early decel; pt turned to right side lying to see if FHR does better during ctx. Vinod 61, CNM on the phone with Dr. Delmy Almanza to relay information while RN remains at bedside with pt. Pt remains on right side lying with peanut ball between legs, O2 on, and LR bolus running  1246 - paged anesthesia for persistent low BPs  1248 - ISREAL Edge returned page, said to give 5mg of ephedrine IV  1252 - ephedrine dose given via IV. RN remains at bedside. 1300 - BP 97/49. Patient and FHR have remained stable since ephedrine dose. Pt remains right side lying with pillow between legs. 1303 - 2nd dose of ephedrine given for low BP (93/49). Pt remains asymptomatic. 1320 - O2 removed. BP is 101/50 and baby has had no decel during last 2 contractions. Dr. Delmy Almanza at bedside  [place] - SVE by Dr. Delmy Almanza; 8/100/0. Pt request to stay in semi vieyra position. 1402 - RN to bedside after FHR decel. SVE by this RN; 9/100/0. Pt position changed to low semifowlers. Spontaneous return to baseline.   Will continue to monitor. 65 - Dr. Anali Boyer at bedside; SVE - pt complete, arnold removed. Nursery in room. Pushing begins  1503 - birth  4617 - placenta  1510 - perineum intact  1715 - Pt ambulated to bathroom with steady gait. Pericare performed and pt able to void. Pt then ambulated back to bed. Pt denies pain, motrin offered, pt declined.   Epidural removed, blue tip intact

## 2019-09-14 NOTE — L&D DELIVERY NOTE
[address] for Birth   Delivery Note    Patient's Name:  Romi Guerrero. MRN: [id number]. Patient's : 1995. Age: 21 y.o. Date of Service:  2019  3:20 PM    Physician:  Rajinder Handley MD    Delivered at 39w5d weeks gestation. DELIVERY SUMMARY: With Epidural pain management Delivered  A Vigorous  Boy over intact perineum. Pause of birth required Blu nmanuever then some suprapubic pressure from the patient's right. No laceration, no repair. Required approximately 60 seconds stimulation before crying started. Last two pushes rotated from ROP to SHERWIN. Nuchal cord x 2. Placenta intact, 3V,  mL. Uncomplicated. Delayed cord clamping at 3 minutes. Vital Signs and Lab Data:   Visit Vitals  BP 99/66   Pulse 94   Temp 98.1 °F (36.7 °C)   Resp 16   Ht 5' 9\" (1.753 m)   Wt 62.1 kg (137 lb)   SpO2 99%   Breastfeeding?  No   BMI 20.23 kg/m²       Temp (24hrs), Av °F (36.7 °C), Min:97.6 °F (36.4 °C), Max:98.4 °F (36.9 °C)    WBC   Date/Time Value Ref Range Status   2019 08:25 AM 13.5 (H) 4.6 - 13.2 K/uL Final   2019 03:23 AM 6.9 4.6 - 13.2 K/uL Final   2019 03:30 PM 10.0 4.6 - 13.2 K/uL Final     HGB   Date/Time Value Ref Range Status   2019 08:25 AM 10.8 (L) 12.0 - 16.0 g/dL Final     Comment:     FOLLOWING RESULTS VERIFIED BY REPETITION:  PREVIOUS SAMPLE CLOTTED CALLED TO [id number] [facility] RN [id number] ON [id number] AT 0906 TO CR     2019 03:23 AM 7.6 (L) 12.0 - 16.0 g/dL Final   2019 03:30 PM 11.0 (L) 12.0 - 16.0 g/dL Final     PLATELET   Date/Time Value Ref Range Status   2019 08:25  135 - 420 K/uL Final     Hgb, External   Date/Time Value Ref Range Status   2019 neg  Final     Prenatal Labs:  Lab Results   Component Value Date/Time    ABO/Rh(D) A NEGATIVE 2019 03:23 AM    ABO,Rh A neg 2019    Rubella, External immune 2019       Plan: Normal Postpartum Care    Attending Attestation: I was present and scrubbed for the entire procedure    Signed By: Mj Parr MD     September 14, 2019

## 2019-09-15 PROBLEM — O14.10 SEVERE PREECLAMPSIA: Status: ACTIVE | Noted: 2019-09-15

## 2019-09-15 LAB
HCT VFR BLD AUTO: 31.4 % (ref 35–45)
HGB BLD-MCNC: 10.5 G/DL (ref 12–16)

## 2019-09-15 PROCEDURE — 86900 BLOOD TYPING SEROLOGIC ABO: CPT

## 2019-09-15 PROCEDURE — 85018 HEMOGLOBIN: CPT

## 2019-09-15 PROCEDURE — 36415 COLL VENOUS BLD VENIPUNCTURE: CPT

## 2019-09-15 PROCEDURE — 85461 HEMOGLOBIN FETAL: CPT

## 2019-09-15 PROCEDURE — 74011250636 HC RX REV CODE- 250/636: Performed by: SPECIALIST

## 2019-09-15 PROCEDURE — 65270000029 HC RM PRIVATE

## 2019-09-15 PROCEDURE — 74011250637 HC RX REV CODE- 250/637: Performed by: SPECIALIST

## 2019-09-15 PROCEDURE — 85014 HEMATOCRIT: CPT

## 2019-09-15 RX ADMIN — ACETAMINOPHEN 650 MG: 325 TABLET ORAL at 02:55

## 2019-09-15 RX ADMIN — SENNOSIDES, DOCUSATE SODIUM 1 TABLET: 50; 8.6 TABLET, FILM COATED ORAL at 08:00

## 2019-09-15 RX ADMIN — HUMAN RHO(D) IMMUNE GLOBULIN 0.3 MG: 300 INJECTION, SOLUTION INTRAMUSCULAR at 13:19

## 2019-09-15 RX ADMIN — ACETAMINOPHEN 650 MG: 325 TABLET ORAL at 18:46

## 2019-09-15 RX ADMIN — IBUPROFEN 800 MG: 400 TABLET, FILM COATED ORAL at 08:00

## 2019-09-15 RX ADMIN — ACETAMINOPHEN 650 MG: 325 TABLET ORAL at 13:18

## 2019-09-15 RX ADMIN — ACETAMINOPHEN 650 MG: 325 TABLET ORAL at 23:02

## 2019-09-15 RX ADMIN — IBUPROFEN 800 MG: 400 TABLET, FILM COATED ORAL at 16:59

## 2019-09-15 NOTE — ROUTINE PROCESS
Verbal shift change report given to Jakob Kirby RN (oncoming nurse) by Sofia Jenkins RN (offgoing nurse). Report included the following information SBAR, Procedure Summary, MAR and Recent Results. 0800 - rounded on pt. Pt denies questions/concerns/needs. Vitals done; all WNL. Will continue to monitor. 1600 - RN to room for vitals. Pt breastfeeding baby and asked that vitals wait until after feeding. RN instructed pt to push call bell when she is ready to have vitals done, pt verbalized understanding. 1645 - pt called out using call bell. Pt states she is finished feeding baby and is ready for vitals.     65 - RN to room for vitals of infant and baby; all WNL

## 2019-09-15 NOTE — ANESTHESIA POSTPROCEDURE EVALUATION
* No procedures listed *.    epidural    Anesthesia Post Evaluation      Multimodal analgesia: multimodal analgesia used between 6 hours prior to anesthesia start to PACU discharge  Patient location during evaluation: bedside  Patient participation: complete - patient participated  Level of consciousness: awake  Pain management: adequate  Airway patency: patent  Anesthetic complications: no  Cardiovascular status: stable  Respiratory status: acceptable  Hydration status: acceptable  Post anesthesia nausea and vomiting:  controlled      No vitals data found for the desired time range.

## 2019-09-15 NOTE — PROGRESS NOTES
1915: Bedside and Verbal shift change report given to george villanueva rn (oncoming nurse) by love borjas rn (offgoing nurse). Report included the following information SBAR, Procedure Summary, Intake/Output, MAR and Recent Results. 2030: This RN to room, pt resting comfortably, denies needs or concerns. VS and assessment WNL. 2300: This RN to room, pt resting comfortably with RR >10.    0245: This RN to room, pt requesting tylenol, provided, see MAR. VS and reassessment WNL. Discussed normal PP bleeding and s/sx to report. Pt verbalizes understanding. 7943: This RN to room, pt resting comfortably with RR > 10.

## 2019-09-15 NOTE — PROGRESS NOTES
Vaginal Birth PROGRESS NOTE Physician:  Dominik Hamilton MD  (See Additional  in [facility] Navigator)  POSTPARTUM DAY 1 9/15/2019        Name: Jorge Reyes MRN: [id number]  SSN: [id number]    YOB: 1995  Age: 21 y. o. Admit Date: 2019    Admitting Physician: Dominik Hamilton MD     Attending Physician:  Tres Coyle MD     Admission Diagnoses: maternity    Discharge Diagnoses:   Information for the patient's :  Doris Grady [[id number]]   Delivery of a 3.6 kg male infant via Vaginal, Spontaneous on 2019 at 3:03 PM  by Dominik Hamilton. Apgars were 7  and 9 . Additional Diagnoses:   Problem List as of 9/15/2019 Date Reviewed: 2019          Codes Class Noted - Resolved    Pregnancy ICD-10-CM: Z34.90  ICD-9-CM: V22.2  2019 - Present        Abdominal cramping ICD-10-CM: R10.9  ICD-9-CM: 789.00  2019 - Present             Lab Results   Component Value Date/Time    Rubella, External immune 2019       Subjective:   She states she has no specific complaints and feels ok. The patient reports she is reasonably comfortable. She states she is adequately managed with ongoing medications. The  infant is doing well. Objective:     Visit Vitals  /50 (BP 1 Location: Left arm, BP Patient Position: At rest)   Pulse (!) 45   Temp 97.7 °F (36.5 °C)   Resp 16   Ht 5' 9\" (1.753 m)   Wt 62.1 kg (137 lb)   SpO2 97%   Breastfeeding?  Unknown   BMI 20.23 kg/m²       WBC   Date/Time Value Ref Range Status   2019 08:25 AM 13.5 (H) 4.6 - 13.2 K/uL Final   2019 03:23 AM 6.9 4.6 - 13.2 K/uL Final   2019 03:30 PM 10.0 4.6 - 13.2 K/uL Final     HGB   Date/Time Value Ref Range Status   09/15/2019 05:22 AM 10.5 (L) 12.0 - 16.0 g/dL Final   2019 08:25 AM 10.8 (L) 12.0 - 16.0 g/dL Final     Comment:     FOLLOWING RESULTS VERIFIED BY REPETITION:  PREVIOUS SAMPLE CLOTTED CALLED TO [id number] [facility] MURIEL Bach[address] 60 ON [id number] AT 0906 TO CR     09/14/2019 03:23 AM 7.6 (L) 12.0 - 16.0 g/dL Final     PLATELET   Date/Time Value Ref Range Status   09/14/2019 08:25  135 - 420 K/uL Final     Hgb, External   Date/Time Value Ref Range Status   05/05/2019 neg  Final       General:       Alert, calm, no apparent distress. Abdomen:   Soft, not distended, no unusual tenderness. Uterine Fundus:   Palpable, below umbilicus, no unusual tenderness. Lower Extremities  No signs of DVT (cords, unusual tenderness)     Assessment:   Postpartum Day 1: Vaginal Birth - Doing Well. Patient appears to be having uncomplicated post-partum course. Continue routine perineal care and maternal education. Dismissal planned for tomorrow. Plan:   Continue Watching for Danger Signs:    Excessive bleeding, Fever, Hypertension or Pain. Checked hemoglobin and hematocrit from this a.m. Monitor Continuing Increase in Diet and Activity:  Ambulation: reduce risks of Thromboembolism, Constipation and Depression. Showering: Luke warm water and above the knees. Voiding:  Expect diuresis. Dietary advance: Foster healing, return of normal energy and breastfeeding support. Emphasized the Following Information:  Continue asking questions:  Seek information NOW start to prepare for departure. Ask Nurse for help:  if excessive pain. Expect evaluation, Ice pack and pain relievers. Vaginal Birth Recovery: Remind handout was provided at 35 weeks gestation.     Author:     Isaias Quijano MD  9/15/2019  8:56 AM

## 2019-09-16 VITALS
BODY MASS INDEX: 20.29 KG/M2 | HEIGHT: 69 IN | DIASTOLIC BLOOD PRESSURE: 69 MMHG | TEMPERATURE: 97.8 F | SYSTOLIC BLOOD PRESSURE: 110 MMHG | RESPIRATION RATE: 18 BRPM | HEART RATE: 72 BPM | OXYGEN SATURATION: 100 % | WEIGHT: 137 LBS

## 2019-09-16 LAB
ABO + RH BLD: NORMAL
ABO + RH BLDCO: NORMAL
BLD PROD TYP BPU: NORMAL
BPU ID: NORMAL
CALLED TO:,BCALL1: NORMAL
FETAL SCREEN,FMHS: NORMAL
STATUS OF UNIT,%ST: NORMAL
UNIT DIVISION, %UDIV: 0

## 2019-09-16 PROCEDURE — 74011250637 HC RX REV CODE- 250/637: Performed by: SPECIALIST

## 2019-09-16 RX ORDER — IBUPROFEN 800 MG/1
800 TABLET ORAL
Qty: 60 TAB | Refills: 1 | Status: SHIPPED | OUTPATIENT
Start: 2019-09-16

## 2019-09-16 RX ADMIN — IBUPROFEN 800 MG: 400 TABLET, FILM COATED ORAL at 01:18

## 2019-09-16 RX ADMIN — ACETAMINOPHEN 650 MG: 325 TABLET ORAL at 03:48

## 2019-09-16 RX ADMIN — IBUPROFEN 800 MG: 400 TABLET, FILM COATED ORAL at 09:57

## 2019-09-16 NOTE — LACTATION NOTE
Mother breast fed her first baby. Mom states this baby has been nursing well and she feels like her milk is beginning to come in. Reviewed milk coming in, supply and demand, hind milk, pumping, diapers, feeding frequency. Lots of discussion, questions answered. Gave BF information, daily log and resource guide. Offered assistance if needed.

## 2019-09-16 NOTE — PROGRESS NOTES
0752- Bedside and Verbal shift change report given to Francy nuñez (oncoming nurse) by BK Storm rn (offgoing nurse). Report included the following information SBAR, Kardex, Procedure Summary, Intake/Output, MAR and Recent Results. Patient aware of hourly rounding and not waking patient if sleeping. 8201- Assessment completed at this time. Call light in reach. No needs. Updated on plan of care. 1033- Sitting up in bed. Finishing birth certificate. 1310- Discharge instructions reviewed with patient. Understanding verbalized of all instructions given. Time given for questions, all questions answered. Electronic signature obtained. 0- Mother taken to car in wheelchair in stable condition holding baby in car seat.

## 2019-09-16 NOTE — DISCHARGE SUMMARY
[address]   [address]  [address] [address] [facility] [address]  [phone]       Obstetrical Discharge Summary     Patient ID:  Latesha Flowers  [id number]  72 y.o.  1995    Admit Date: 2019    Discharge Date: 2019     Admitting Physician: Parish Rey MD     Admission Diagnoses: Severe preeclampsia [O14.10]    Final Diagnosis: [email or web address] Delivered    Additional Diagnoses:Present on Admission:  **None**         OB History        5    Para   2    Term   2            AB   3    Living   2       SAB        TAB        Ectopic        Molar        Multiple   0    Live Births   2              Lab Results   Component Value Date/Time    Rubella, External immune 2019       Baby link  Information for the patient's :  Yadira Moran [[id number]]     Delivery Type: Vaginal, Spontaneous   Delivery Date: 2019   Delivery Time: 3:03 PM     Birth Weight: 3.6 kg     Sex:  male  Delivery Clinician:  Marycruz Goldstein   Gestational Age: 38w11d    Presentation:     Position:             Apgars were 7  and 9      Resuscitation Method:       Meconium Stained:      Living Status: Living       Placenta Date/Time: 2019  3:09 PM   Placenta Removal: Spontaneous   Placenta Appearance: Normal;Intact    Cord Information: 3 Vessels    Cord Events: Nuchal Cord Without Compressions       Disposition of Cord Blood: Lab    Blood Gases Sent?:  No      Infant Information:   Information for the patient's :  Yadira Moran [[id number]]         Baby Procedures:    Information for the patient's :  Yadira Moran [[id number]]        Diet:  Regular  Feeding Method: Infant Feeding: Breast Milk    Vitals:    Patient Vitals for the past 8 hrs:   BP Temp Pulse Resp   19 0350 115/81 98.2 °F (36.8 °C) 67 16     Temp (24hrs), Av.9 °F (36.6 °C), Min:97.5 °F (36.4 °C), Max:98.2 °F (36.8 °C)      Vital signs stable, afebrile.     Exam:  Patient is in good general condition. Emotionally: appears to be stable  Fundus:  Firm and not tender. Lower extremities are negative for swelling, cords or tenderness. Lab/Data Review:  CBC:   Lab Results   Component Value Date/Time    WBC 13.5 (H) 09/14/2019 08:25 AM    RBC 3.66 (L) 09/14/2019 08:25 AM    HGB 10.5 (L) 09/15/2019 05:22 AM    HCT 31.4 (L) 09/15/2019 05:22 AM    PLATELET 352 27/49/2724 08:25 AM    Hgb, External neg 05/05/2019    Hct, External 36.7 05/05/2019     Lab results reviewed. For significant abnormal values and values requiring intervention, see assessment and plan. Activity: as tolerated    Discharge Instructions: Nothing in vagina, no heavy lifting or exercise for 6 weeks. No driving for 1 week or while taking narcotics. SITZ bath for perineal discomfort. F/U 6 weeks post partum check. Call for heavy bleeding, temperature over 101 degrees, heavy vaginal bleeding, foul vaginal odor or worsening abdominal pain. Medications:   Current Discharge Medication List      START taking these medications    Details   ibuprofen (MOTRIN) 800 mg tablet Take 1 Tab by mouth every eight (8) hours as needed for Pain. Qty: 60 Tab, Refills: 1         CONTINUE these medications which have NOT CHANGED    Details   [id number] sanjuanita-folic-dha (PRENATAL DHA+COMPLETE PRENATAL) -300 mg-mcg-mg cmpk Take  by mouth. Indications: pregnancy               Condition at discharge: Stable and doing well.   Disposition: Home    September 16, 2019  Kvng Limon

## 2019-09-16 NOTE — DISCHARGE INSTRUCTIONS

## 2019-09-16 NOTE — PROGRESS NOTES
Bedside shift change report given to BK Storm RN (oncoming nurse) by CRISELDA Avila (offgoing nurse). Report included the following information SBAR, Kardex, Intake/Output and MAR.     1940: pt resting in bed. Plan of care reviewed. Assessment and v/s.

## 2022-03-19 PROBLEM — O14.10 SEVERE PREECLAMPSIA: Status: ACTIVE | Noted: 2019-09-15

## 2022-03-19 PROBLEM — Z34.90 PREGNANCY: Status: ACTIVE | Noted: 2019-05-23

## 2022-03-19 PROBLEM — R10.9 ABDOMINAL CRAMPING: Status: ACTIVE | Noted: 2019-05-23

## 2023-01-31 RX ORDER — IBUPROFEN 800 MG/1
800 TABLET ORAL EVERY 8 HOURS PRN
COMMUNITY
Start: 2019-09-16